# Patient Record
Sex: FEMALE | Race: WHITE | URBAN - METROPOLITAN AREA
[De-identification: names, ages, dates, MRNs, and addresses within clinical notes are randomized per-mention and may not be internally consistent; named-entity substitution may affect disease eponyms.]

---

## 2023-03-22 ENCOUNTER — OFFICE VISIT (OUTPATIENT)
Dept: URBAN - METROPOLITAN AREA CLINIC 54 | Facility: CLINIC | Age: 78
End: 2023-03-22
Payer: MEDICARE

## 2023-03-22 DIAGNOSIS — H25.12 AGE-RELATED NUCLEAR CATARACT, LEFT EYE: ICD-10-CM

## 2023-03-22 DIAGNOSIS — H33.312 HORSESHOE TEAR OF RETINA WITHOUT DETACHMENT, LEFT EYE: ICD-10-CM

## 2023-03-22 DIAGNOSIS — H35.81 RETINAL EDEMA: Primary | ICD-10-CM

## 2023-03-22 PROCEDURE — 99204 OFFICE O/P NEW MOD 45 MIN: CPT | Performed by: OPHTHALMOLOGY

## 2023-03-22 PROCEDURE — 92235 FLUORESCEIN ANGRPH MLTIFRAME: CPT | Performed by: OPHTHALMOLOGY

## 2023-03-22 PROCEDURE — 92134 CPTRZ OPH DX IMG PST SGM RTA: CPT | Performed by: OPHTHALMOLOGY

## 2023-03-22 RX ORDER — KETOROLAC TROMETHAMINE 5 MG/ML
0.5 % SOLUTION OPHTHALMIC
Qty: 5 | Refills: 3 | Status: INACTIVE
Start: 2023-03-22 | End: 2023-06-01

## 2023-03-22 RX ORDER — PREDNISOLONE ACETATE 10 MG/ML
1 % SUSPENSION/ DROPS OPHTHALMIC
Qty: 5 | Refills: 3 | Status: INACTIVE
Start: 2023-03-22 | End: 2023-06-01

## 2023-03-22 ASSESSMENT — INTRAOCULAR PRESSURE
OD: 15
OS: 15

## 2023-03-22 NOTE — IMPRESSION/PLAN
Impression: Horseshoe tear of retina without detachment, left eye: H33.312. Plan: s/p laser retinopexy.  observe with RD precautions

## 2023-03-22 NOTE — IMPRESSION/PLAN
Impression: Retinal edema: H35.81 Right. Plan: She complains of blurry vision OD x 8-9 months. She is s/p vitrectomy and likely ERM removal DETAR RODRIGUEZ). OCT today shows PED/mild edema s/p ERM removal OD and no IRF/SRF OS. FA today shows: CME OD and no CNVM OU or leakage OS. Colors show PED OD. We discussed the findings. We discussed this may be residual from her prior ERM and her vision may not improve. However, I recommend she start PF/ketorolac QID OD. If there is no improvement, we may consider STK. 
thanks Rory Vivar RTC 4-6 weeks OCT OU Prior notes from other provider(s) have been reviewed in conjunction with today's visit.

## 2023-05-03 ENCOUNTER — OFFICE VISIT (OUTPATIENT)
Dept: URBAN - METROPOLITAN AREA CLINIC 54 | Facility: CLINIC | Age: 78
End: 2023-05-03
Payer: COMMERCIAL

## 2023-05-03 DIAGNOSIS — H35.81 RETINAL EDEMA: Primary | ICD-10-CM

## 2023-05-03 DIAGNOSIS — H25.12 AGE-RELATED NUCLEAR CATARACT, LEFT EYE: ICD-10-CM

## 2023-05-03 DIAGNOSIS — H33.312 HORSESHOE TEAR OF RETINA WITHOUT DETACHMENT, LEFT EYE: ICD-10-CM

## 2023-05-03 PROCEDURE — 99213 OFFICE O/P EST LOW 20 MIN: CPT | Performed by: OPHTHALMOLOGY

## 2023-05-03 PROCEDURE — 92134 CPTRZ OPH DX IMG PST SGM RTA: CPT | Performed by: OPHTHALMOLOGY

## 2023-05-03 ASSESSMENT — INTRAOCULAR PRESSURE
OD: 13
OS: 11

## 2023-05-03 NOTE — IMPRESSION/PLAN
Impression: Retinal edema: H35.81 Right. Plan: She feels her vision is stable. There has been no improvement on topical therapy. She is s/p vitrectomy and likely ERM removal DETLIANET FRIAS). OCT today shows PED/mild edema s/p ERM removal OD and no IRF/SRF OS. FA 3/22/23 shows: CME OD and no CNVM OU or leakage OS. Colors show PED OD. We discussed the findings. We discussed this may be residual from her prior ERM and her vision may not improve. We discussed trying STK injection and discussed the R/IB/A in detail. She defers this for now and will taper off her PF/ketorolac. She will see you for refraction. [facility] Prior notes from other provider(s) have been reviewed in conjunction with today's visit.

## 2023-11-29 ENCOUNTER — OFFICE VISIT (OUTPATIENT)
Dept: URBAN - METROPOLITAN AREA CLINIC 54 | Facility: CLINIC | Age: 78
End: 2023-11-29
Payer: MEDICARE

## 2023-11-29 DIAGNOSIS — H35.81 RETINAL EDEMA: Primary | ICD-10-CM

## 2023-11-29 DIAGNOSIS — H33.312 HORSESHOE TEAR OF RETINA WITHOUT DETACHMENT, LEFT EYE: ICD-10-CM

## 2023-11-29 DIAGNOSIS — H25.12 AGE-RELATED NUCLEAR CATARACT, LEFT EYE: ICD-10-CM

## 2023-11-29 PROCEDURE — 92134 CPTRZ OPH DX IMG PST SGM RTA: CPT | Performed by: OPHTHALMOLOGY

## 2023-11-29 PROCEDURE — 99213 OFFICE O/P EST LOW 20 MIN: CPT | Performed by: OPHTHALMOLOGY

## 2023-11-29 ASSESSMENT — INTRAOCULAR PRESSURE
OS: 12
OD: 12